# Patient Record
Sex: MALE
[De-identification: names, ages, dates, MRNs, and addresses within clinical notes are randomized per-mention and may not be internally consistent; named-entity substitution may affect disease eponyms.]

---

## 2020-12-22 ENCOUNTER — HOSPITAL ENCOUNTER (OUTPATIENT)
Dept: HOSPITAL 93 - AMB-ENDOS | Age: 80
Discharge: HOME | End: 2020-12-22
Attending: COLON & RECTAL SURGERY
Payer: COMMERCIAL

## 2020-12-22 DIAGNOSIS — D12.4: Primary | ICD-10-CM

## 2020-12-22 DIAGNOSIS — Z20.828: ICD-10-CM

## 2020-12-22 DIAGNOSIS — D12.5: ICD-10-CM

## 2020-12-22 DIAGNOSIS — K62.7: ICD-10-CM

## 2021-01-08 ENCOUNTER — HOSPITAL ENCOUNTER (OUTPATIENT)
Dept: HOSPITAL 93 - MRI | Age: 81
Discharge: HOME | End: 2021-01-08
Attending: COLON & RECTAL SURGERY
Payer: COMMERCIAL

## 2021-01-08 DIAGNOSIS — C78.7: ICD-10-CM

## 2021-01-08 DIAGNOSIS — C20: Primary | ICD-10-CM

## 2021-01-08 DIAGNOSIS — R97.0: ICD-10-CM

## 2021-01-08 PROCEDURE — 74183 MRI ABD W/O CNTR FLWD CNTR: CPT

## 2021-01-08 PROCEDURE — 72197 MRI PELVIS W/O & W/DYE: CPT

## 2021-03-11 ENCOUNTER — HOSPITAL ENCOUNTER (INPATIENT)
Dept: HOSPITAL 93 - SURG | Age: 81
LOS: 11 days | Discharge: HOME | DRG: 330 | End: 2021-03-22
Attending: COLON & RECTAL SURGERY | Admitting: COLON & RECTAL SURGERY
Payer: COMMERCIAL

## 2021-03-11 VITALS — BODY MASS INDEX: 23.7 KG/M2 | WEIGHT: 151 LBS | HEIGHT: 67 IN

## 2021-03-11 DIAGNOSIS — C78.7: ICD-10-CM

## 2021-03-11 DIAGNOSIS — D50.0: ICD-10-CM

## 2021-03-11 DIAGNOSIS — E03.9: ICD-10-CM

## 2021-03-11 DIAGNOSIS — I11.9: ICD-10-CM

## 2021-03-11 DIAGNOSIS — R97.0: ICD-10-CM

## 2021-03-11 DIAGNOSIS — E78.5: ICD-10-CM

## 2021-03-11 DIAGNOSIS — E11.9: ICD-10-CM

## 2021-03-11 DIAGNOSIS — C19: Primary | ICD-10-CM

## 2021-03-18 PROCEDURE — 07BC4ZZ EXCISION OF PELVIS LYMPHATIC, PERCUTANEOUS ENDOSCOPIC APPROACH: ICD-10-PCS | Performed by: COLON & RECTAL SURGERY

## 2021-03-18 PROCEDURE — 0DTN4ZZ RESECTION OF SIGMOID COLON, PERCUTANEOUS ENDOSCOPIC APPROACH: ICD-10-PCS | Performed by: COLON & RECTAL SURGERY

## 2021-03-18 PROCEDURE — 0DTP4ZZ RESECTION OF RECTUM, PERCUTANEOUS ENDOSCOPIC APPROACH: ICD-10-PCS | Performed by: COLON & RECTAL SURGERY

## 2021-03-18 PROCEDURE — 0D1B4Z4 BYPASS ILEUM TO CUTANEOUS, PERCUTANEOUS ENDOSCOPIC APPROACH: ICD-10-PCS | Performed by: COLON & RECTAL SURGERY

## 2021-05-13 ENCOUNTER — HOSPITAL ENCOUNTER (OUTPATIENT)
Dept: HOSPITAL 93 - TOM | Age: 81
Discharge: HOME | End: 2021-05-13
Attending: CLINIC/CENTER
Payer: COMMERCIAL

## 2021-05-13 DIAGNOSIS — Z51.11: ICD-10-CM

## 2021-05-13 DIAGNOSIS — C78.7: ICD-10-CM

## 2021-05-13 DIAGNOSIS — C20: Primary | ICD-10-CM

## 2021-06-30 ENCOUNTER — HOSPITAL ENCOUNTER (OUTPATIENT)
Dept: HOSPITAL 93 - RX STUDY | Age: 81
Discharge: HOME | End: 2021-06-30
Attending: COLON & RECTAL SURGERY
Payer: COMMERCIAL

## 2021-06-30 DIAGNOSIS — C20: Primary | ICD-10-CM

## 2021-06-30 DIAGNOSIS — Z93.2: ICD-10-CM

## 2021-06-30 DIAGNOSIS — C78.7: ICD-10-CM

## 2021-06-30 DIAGNOSIS — R97.0: ICD-10-CM

## 2021-08-11 ENCOUNTER — HOSPITAL ENCOUNTER (INPATIENT)
Dept: HOSPITAL 93 - SURH | Age: 81
LOS: 8 days | Discharge: HOME | DRG: 348 | End: 2021-08-19
Attending: COLON & RECTAL SURGERY | Admitting: COLON & RECTAL SURGERY
Payer: COMMERCIAL

## 2021-08-11 VITALS — WEIGHT: 132 LBS | BODY MASS INDEX: 20.72 KG/M2 | HEIGHT: 67 IN

## 2021-08-11 DIAGNOSIS — I11.9: ICD-10-CM

## 2021-08-11 DIAGNOSIS — C20: ICD-10-CM

## 2021-08-11 DIAGNOSIS — E03.9: ICD-10-CM

## 2021-08-11 DIAGNOSIS — Z43.2: Primary | ICD-10-CM

## 2021-08-11 DIAGNOSIS — E78.00: ICD-10-CM

## 2021-08-16 PROCEDURE — 0DBB4ZZ EXCISION OF ILEUM, PERCUTANEOUS ENDOSCOPIC APPROACH: ICD-10-PCS | Performed by: COLON & RECTAL SURGERY

## 2022-05-03 ENCOUNTER — HOSPITAL ENCOUNTER (OUTPATIENT)
Dept: HOSPITAL 93 - NUCLEAR | Age: 82
Discharge: HOME | End: 2022-05-03
Attending: COLON & RECTAL SURGERY
Payer: COMMERCIAL

## 2022-05-03 DIAGNOSIS — C78.7: ICD-10-CM

## 2022-05-03 DIAGNOSIS — R97.0: ICD-10-CM

## 2022-05-03 DIAGNOSIS — Z93.2: ICD-10-CM

## 2022-05-03 DIAGNOSIS — C20: Primary | ICD-10-CM

## 2022-05-03 PROCEDURE — 78812 PET IMAGE SKULL-THIGH: CPT

## 2023-05-03 ENCOUNTER — HOSPITAL ENCOUNTER (OUTPATIENT)
Dept: HOSPITAL 93 - NUCLEAR | Age: 83
Discharge: HOME | End: 2023-05-03
Payer: COMMERCIAL

## 2023-05-03 DIAGNOSIS — R97.0: ICD-10-CM

## 2023-05-03 DIAGNOSIS — C20: Primary | ICD-10-CM

## 2023-05-03 DIAGNOSIS — C78.7: ICD-10-CM

## 2023-05-03 DIAGNOSIS — Z93.2: ICD-10-CM

## 2023-05-03 PROCEDURE — 78815 PET IMAGE W/CT SKULL-THIGH: CPT

## 2023-07-20 ENCOUNTER — HOSPITAL ENCOUNTER (INPATIENT)
Dept: HOSPITAL 93 - ER | Age: 83
LOS: 29 days | Discharge: HOME | DRG: 329 | End: 2023-08-18
Attending: INTERNAL MEDICINE | Admitting: INTERNAL MEDICINE
Payer: COMMERCIAL

## 2023-07-20 VITALS — WEIGHT: 113 LBS | BODY MASS INDEX: 18.16 KG/M2 | HEIGHT: 66 IN

## 2023-07-20 DIAGNOSIS — Z90.49: ICD-10-CM

## 2023-07-20 DIAGNOSIS — C41.0: ICD-10-CM

## 2023-07-20 DIAGNOSIS — C20: ICD-10-CM

## 2023-07-20 DIAGNOSIS — B37.49: ICD-10-CM

## 2023-07-20 DIAGNOSIS — Z79.84: ICD-10-CM

## 2023-07-20 DIAGNOSIS — I11.9: ICD-10-CM

## 2023-07-20 DIAGNOSIS — L98.418: ICD-10-CM

## 2023-07-20 DIAGNOSIS — S01.402D: ICD-10-CM

## 2023-07-20 DIAGNOSIS — E11.65: ICD-10-CM

## 2023-07-20 DIAGNOSIS — K68.19: ICD-10-CM

## 2023-07-20 DIAGNOSIS — K91.89: Primary | ICD-10-CM

## 2023-07-20 DIAGNOSIS — K61.4: ICD-10-CM

## 2023-07-20 DIAGNOSIS — K51.811: ICD-10-CM

## 2023-07-20 DIAGNOSIS — D63.0: ICD-10-CM

## 2023-07-20 DIAGNOSIS — B95.62: ICD-10-CM

## 2023-07-20 DIAGNOSIS — E03.9: ICD-10-CM

## 2023-07-20 DIAGNOSIS — D50.0: ICD-10-CM

## 2023-07-20 DIAGNOSIS — K61.2: ICD-10-CM

## 2023-07-20 LAB
EOSINOPHIL NFR BLD AUTO: 0.3 % (ref 0–7)
EOSINOPHIL NFR BLD AUTO: 0.5 % (ref 0–7)

## 2023-07-20 PROCEDURE — 72196 MRI PELVIS W/DYE: CPT

## 2023-07-20 PROCEDURE — 30233N1 TRANSFUSION OF NONAUTOLOGOUS RED BLOOD CELLS INTO PERIPHERAL VEIN, PERCUTANEOUS APPROACH: ICD-10-PCS | Performed by: INTERNAL MEDICINE

## 2023-07-20 PROCEDURE — BW211ZZ COMPUTERIZED TOMOGRAPHY (CT SCAN) OF ABDOMEN AND PELVIS USING LOW OSMOLAR CONTRAST: ICD-10-PCS | Performed by: EMERGENCY MEDICINE

## 2023-07-20 PROCEDURE — 72198 MR ANGIO PELVIS W/O & W/DYE: CPT

## 2023-07-20 NOTE — NUR
SE RECIBE PACIENTE ALERTA Y ORIENTADO X 3, AMBULANDO CON ASISTENCIA DE [place]
 Y RESPONDIENDO A ESTIMULOS. PACIENTE Y FAMILIAR VERBALIZAN QUE ES REFERIDO POR
DR MARROQUIN POR HEMOGLOBINA BAJA AL MOMENTO SE PRESENTA ASINTOMATICO CARMINA
REFIERE. S PROVEE LABORATORIOS Y REFERIDO MEDICO JUNTO CON HISTORIAL DE PTE A
DR MADISON. SE UBICA EN JH #7. PTE CON FISTULA EN MANDIBULA LT CON
VENDAJES LIMPIOS Y SECOS. LA CUAL SE TRATA CON HIPERBARICA.

## 2023-07-20 NOTE — NUR
SE EDUCA PACIENTES SOBRE EL TX MEDICO Y ABDIRASHID REFIERE ENTENDER. SE CANALIZA Y SE
COLOCA IVFS. SE FRANCESCO MUESTRAS DE LABORATORIO Y SE ENTREGA U/A. SE REALIZA EKG
Y SE PRESENTA AL DR. PARDO. PENDIENTE A PLACA PORTABLE

## 2023-07-20 NOTE — NUR
SE REQUISA 2 UNIDADES DE PRBC`S Y PACIENTE FIRMA CONSENTIMIENTO DE TRANSFUCION.
PERSONAL DE BANCO DE CARMEN INDICA QUE PACIENTE NO TIENE RECORD PREVIO. SE ZORAIDA
COPIA DE LA REQUISICION DEL PACIENTE.

## 2023-07-22 LAB — EOSINOPHIL NFR BLD AUTO: 0.7 % (ref 0–7)

## 2023-07-23 PROCEDURE — 8E0ZXY6 ISOLATION: ICD-10-PCS | Performed by: INTERNAL MEDICINE

## 2023-07-24 PROCEDURE — BW3GYZZ MAGNETIC RESONANCE IMAGING (MRI) OF PELVIC REGION USING OTHER CONTRAST: ICD-10-PCS | Performed by: STUDENT IN AN ORGANIZED HEALTH CARE EDUCATION/TRAINING PROGRAM

## 2023-07-25 LAB — EOSINOPHIL NFR BLD AUTO: 1.4 % (ref 0–7)

## 2023-07-27 PROCEDURE — 02HV33Z INSERTION OF INFUSION DEVICE INTO SUPERIOR VENA CAVA, PERCUTANEOUS APPROACH: ICD-10-PCS | Performed by: INTERNAL MEDICINE

## 2023-07-27 PROCEDURE — 0W9H30Z DRAINAGE OF RETROPERITONEUM WITH DRAINAGE DEVICE, PERCUTANEOUS APPROACH: ICD-10-PCS | Performed by: RADIOLOGY

## 2023-07-29 LAB — EOSINOPHIL NFR BLD AUTO: 1.1 % (ref 0–7)

## 2023-07-31 LAB — EOSINOPHIL NFR BLD AUTO: 0.8 % (ref 0–7)

## 2023-08-02 LAB — EOSINOPHIL NFR BLD AUTO: 0.8 % (ref 0–7)

## 2023-08-02 PROCEDURE — BW3GYZZ MAGNETIC RESONANCE IMAGING (MRI) OF PELVIC REGION USING OTHER CONTRAST: ICD-10-PCS | Performed by: INTERNAL MEDICINE

## 2023-08-05 LAB — EOSINOPHIL NFR BLD AUTO: 1.3 % (ref 0–7)

## 2023-08-07 PROCEDURE — 0DBP8ZX EXCISION OF RECTUM, VIA NATURAL OR ARTIFICIAL OPENING ENDOSCOPIC, DIAGNOSTIC: ICD-10-PCS | Performed by: COLON & RECTAL SURGERY

## 2023-08-09 LAB — EOSINOPHIL NFR BLD AUTO: 1.3 % (ref 0–7)

## 2023-08-10 LAB — EOSINOPHIL NFR BLD AUTO: 1.5 % (ref 0–7)

## 2023-08-12 PROCEDURE — 30243N1 TRANSFUSION OF NONAUTOLOGOUS RED BLOOD CELLS INTO CENTRAL VEIN, PERCUTANEOUS APPROACH: ICD-10-PCS | Performed by: INTERNAL MEDICINE

## 2023-08-13 LAB — EOSINOPHIL NFR BLD AUTO: 3.2 % (ref 0–7)

## 2023-08-14 LAB — EOSINOPHIL NFR BLD AUTO: 0.7 % (ref 0–7)

## 2023-08-14 PROCEDURE — 0D1L4Z4 BYPASS TRANSVERSE COLON TO CUTANEOUS, PERCUTANEOUS ENDOSCOPIC APPROACH: ICD-10-PCS | Performed by: COLON & RECTAL SURGERY

## 2023-08-15 LAB — EOSINOPHIL NFR BLD AUTO: 0.6 % (ref 0–7)

## 2023-08-16 LAB
EOSINOPHIL NFR BLD AUTO: 0.9 % (ref 0–7)
EOSINOPHIL NFR BLD AUTO: 2.4 % (ref 0–7)

## 2023-08-17 LAB — EOSINOPHIL NFR BLD AUTO: 2.3 % (ref 0–7)
